# Patient Record
Sex: FEMALE | Race: BLACK OR AFRICAN AMERICAN | URBAN - METROPOLITAN AREA
[De-identification: names, ages, dates, MRNs, and addresses within clinical notes are randomized per-mention and may not be internally consistent; named-entity substitution may affect disease eponyms.]

---

## 2021-10-08 ENCOUNTER — TESTING ONLY (OUTPATIENT)
Dept: URBAN - METROPOLITAN AREA CLINIC 1 | Facility: CLINIC | Age: 86
End: 2021-10-08
Payer: MEDICARE

## 2021-10-08 DIAGNOSIS — H40.1132 PRIMARY OPEN-ANGLE GLAUCOMA, BILATERAL, MODERATE STAGE: Primary | ICD-10-CM

## 2021-10-08 PROCEDURE — 92083 EXTENDED VISUAL FIELD XM: CPT | Performed by: SPECIALIST

## 2021-11-09 ENCOUNTER — OFFICE VISIT (OUTPATIENT)
Dept: URBAN - METROPOLITAN AREA CLINIC 1 | Facility: CLINIC | Age: 86
End: 2021-11-09
Payer: MEDICARE

## 2021-11-09 PROCEDURE — 92286 ANT SGM IMG I&R SPECLR MIC: CPT | Performed by: SPECIALIST

## 2021-11-09 PROCEDURE — 99212 OFFICE O/P EST SF 10 MIN: CPT | Performed by: SPECIALIST

## 2021-11-09 ASSESSMENT — INTRAOCULAR PRESSURE
OS: 17
OD: 21

## 2021-11-09 NOTE — IMPRESSION/PLAN
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 Plan: OU: Discussed diagnosis in detail with patient. Advised patient of condition. Reassured patient of current condition and treatment. No need for further treatment or surgical intervention.

## 2021-11-09 NOTE — IMPRESSION/PLAN
Impression: Primary open-angle glaucoma, bilateral, moderate stage: K27.9213. Plan: Intraocular pressure well controlled, tolerating medications. Will continue with same regimen. Reviewed HVF done 10/08/21. Pt to continue with Azopt bid ou and Lumigan qhs ou. 

RTC 4 month f/u RNFL [facility]

## 2022-03-16 ENCOUNTER — OFFICE VISIT (OUTPATIENT)
Dept: URBAN - METROPOLITAN AREA CLINIC 1 | Facility: CLINIC | Age: 87
End: 2022-03-16
Payer: MEDICARE

## 2022-03-16 DIAGNOSIS — H18.519: ICD-10-CM

## 2022-03-16 DIAGNOSIS — Z96.1 PRESENCE OF INTRAOCULAR LENS: ICD-10-CM

## 2022-03-16 DIAGNOSIS — H35.3131 NONEXUDATIVE MACULAR DEGENERATION, EARLY DRY STAGE, BILATERAL: ICD-10-CM

## 2022-03-16 DIAGNOSIS — H47.233 GLAUCOMATOUS OPTIC ATROPHY, BILATERAL: ICD-10-CM

## 2022-03-16 DIAGNOSIS — H04.123 TEAR FILM INSUFFICIENCY OF BILATERAL LACRIMAL GLANDS: ICD-10-CM

## 2022-03-16 PROCEDURE — 92133 CPTRZD OPH DX IMG PST SGM ON: CPT | Performed by: SPECIALIST

## 2022-03-16 PROCEDURE — 99213 OFFICE O/P EST LOW 20 MIN: CPT | Performed by: SPECIALIST

## 2022-03-16 ASSESSMENT — INTRAOCULAR PRESSURE
OD: 15
OS: 15

## 2022-03-16 NOTE — IMPRESSION/PLAN
Impression: Primary open-angle glaucoma, bilateral, moderate stage: T06.1702. Plan: Intraocular pressure well controlled, tolerating medications. Will continue with same regimen.  Lumigan QHS OU and Azopt BID Charu Clement

## 2022-10-20 ENCOUNTER — OFFICE VISIT (OUTPATIENT)
Dept: URBAN - METROPOLITAN AREA CLINIC 1 | Facility: CLINIC | Age: 87
End: 2022-10-20
Payer: MEDICARE

## 2022-10-20 DIAGNOSIS — H35.3131 NONEXUDATIVE MACULAR DEGENERATION, EARLY DRY STAGE, BILATERAL: ICD-10-CM

## 2022-10-20 DIAGNOSIS — H47.021 HEMORRHAGE IN OPTIC NERVE SHEATH, RIGHT EYE: ICD-10-CM

## 2022-10-20 DIAGNOSIS — H47.233 GLAUCOMATOUS OPTIC ATROPHY, BILATERAL: ICD-10-CM

## 2022-10-20 DIAGNOSIS — H40.1132 PRIMARY OPEN-ANGLE GLAUCOMA, BILATERAL, MODERATE STAGE: Primary | ICD-10-CM

## 2022-10-20 PROCEDURE — 92250 FUNDUS PHOTOGRAPHY W/I&R: CPT | Performed by: SPECIALIST

## 2022-10-20 PROCEDURE — 99214 OFFICE O/P EST MOD 30 MIN: CPT | Performed by: SPECIALIST

## 2022-10-20 RX ORDER — DORZOLAMIDE HCL 20 MG/ML
2 % SOLUTION/ DROPS OPHTHALMIC
Qty: 10 | Refills: 3 | Status: ACTIVE
Start: 2022-10-20

## 2022-10-20 RX ORDER — BIMATOPROST 0.1 MG/ML
0.01 % SOLUTION/ DROPS OPHTHALMIC
Qty: 2.5 | Refills: 3 | Status: ACTIVE
Start: 2022-10-20

## 2022-10-20 RX ORDER — BIMATOPROST 0.1 MG/ML
0.01 % SOLUTION/ DROPS OPHTHALMIC
Qty: 2.5 | Refills: 3 | Status: INACTIVE
Start: 2022-10-20 | End: 2022-10-20

## 2022-10-20 ASSESSMENT — VISUAL ACUITY
OD: 20/30
OS: 20/40

## 2022-10-20 ASSESSMENT — INTRAOCULAR PRESSURE
OD: 21
OS: 18

## 2022-10-20 NOTE — IMPRESSION/PLAN
Impression: Primary open-angle glaucoma, bilateral, moderate stage: A93.3413. Plan: Discussed diagnosis with patient; reviewed testing. Stable. IOP is at target range. Will continue monitor patient on current regimen. Continue with Azopt BID OU and Lumigan QHS OU. During her appeal with insurance for Azopt patient will use Dorzolamide BID OU. Patient will be referred to Dr. Cruz Tellez for surgical intervention due to IOP too high and it is difficult for patient to remember to use her drops.

## 2022-10-20 NOTE — IMPRESSION/PLAN
Impression: Nonexudative macular degeneration, early dry stage, bilateral: H35.3131. Plan: Macular degeneration, dry type with stable vision. Will continue to monitor vision and the patient has been instructed to call with any vision changes. Use of Amsler grid was explained. Vitamins recommended. Advised to eat healthy green leafy vegetables.

## 2022-10-20 NOTE — IMPRESSION/PLAN
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 Plan: Discussed diagnosis in detail with patient. Advised patient of condition. Will continue to observe condition and or symptoms.

## 2023-09-21 NOTE — IMPRESSION/PLAN
Impression: Glaucomatous optic atrophy, bilateral: H47.233. Plan: Patient with stable ONC OU and no evidence of glaucomatous progression. No treatment at this time. We will continue to observe closely. Treating blepharitis/Dry eye syndrome   Ophthalmologists at the [facility] recommend controlling the problem through eyelid hygiene.   Eyelid hygiene is cleansing of the lid to promote a normal ocular surface. It is important to cleanse so that it does not develop into a more serious condition. Blepharitis cannot be cured, but with eyelid hygiene done on a daily basis, the symptoms may be controlled.   Cleansing the eye  Hot Compresses: perform 2 times daily, or as ordered by your doctor      A.  Soak clean washcloth in hot water  OR      B.  Commercially available hot pack  OR      C.  Dry rice in a clean sock (dress sock or other thin sock is best), microwave until hot (20-30 seconds)   Cleansing the eye  Hot Compresses: perform 2 times daily, or as ordered by your doctor  Soak clean washcloth in hot water  OR  Commercially available hot pack  OR  Dry rice in a clean sock (dress sock or other thin sock is best), microwave until hot (20-30 seconds)     - Place hot compress on closed eyelid for 3-5 minutes (make sure not too hot)   - Gently massage eyelids for 30 seconds    Eyelid Scrubs:   Make solution with   water +   baby shampoo OR  In shower: place baby shampoo on fingertips OR  Steri-Lid cleansing solution    -  Use solution of choice on fingertips to  brush  your eyelids (like brushing teeth) for 30 seconds  -  Rinse eyelids and eyelashes with clean water  - Wipe dry with clean, dry cloth (joleen cloth is best)      Additional care for blepharitis  Keep your hands and face clean. Be careful not to touch or rub your eyes with soiled handkerchiefs, dirty fingers, etc. Women should avoid the use of eye makeup during the early stages of treatment. When cosmetic use is resumed, replace liquid products because your old products may be contaminated. Remove all eye makeup before bedtime.   Occasionally, medication may be prescribed to treat blepharitis, but only your doctor can decide if you are a candidate  for this treatment.

## 2024-03-28 ENCOUNTER — OFFICE VISIT (OUTPATIENT)
Dept: URBAN - METROPOLITAN AREA CLINIC 1 | Facility: CLINIC | Age: 89
End: 2024-03-28
Payer: MEDICARE

## 2024-03-28 DIAGNOSIS — H47.293 OTHER OPTIC ATROPHY, BILATERAL: ICD-10-CM

## 2024-03-28 DIAGNOSIS — H35.3131 NONEXUDATIVE MACULAR DEGENERATION, EARLY DRY STAGE, BILATERAL: ICD-10-CM

## 2024-03-28 DIAGNOSIS — H04.123 TEAR FILM INSUFFICIENCY OF BILATERAL LACRIMAL GLANDS: Primary | ICD-10-CM

## 2024-03-28 DIAGNOSIS — Z96.1 PRESENCE OF INTRAOCULAR LENS: ICD-10-CM

## 2024-03-28 DIAGNOSIS — H40.1132 PRIMARY OPEN-ANGLE GLAUCOMA, BILATERAL, MODERATE STAGE: ICD-10-CM

## 2024-03-28 DIAGNOSIS — H52.223 REGULAR ASTIGMATISM, BILATERAL: ICD-10-CM

## 2024-03-28 PROCEDURE — 99203 OFFICE O/P NEW LOW 30 MIN: CPT

## 2024-03-28 RX ORDER — LEVOTHYROXINE SODIUM 50 UG/1
CAPSULE ORAL
Qty: 0 | Refills: 0 | Status: ACTIVE
Start: 2024-03-28

## 2024-03-28 RX ORDER — ROSUVASTATIN CALCIUM 10 MG/1
10 MG TABLET, FILM COATED ORAL
Qty: 0 | Refills: 0 | Status: ACTIVE
Start: 2024-03-28

## 2024-03-28 ASSESSMENT — INTRAOCULAR PRESSURE
OD: 19
OS: 23

## 2025-08-22 ENCOUNTER — OFFICE VISIT (OUTPATIENT)
Dept: URBAN - METROPOLITAN AREA CLINIC 5 | Facility: CLINIC | Age: OVER 89
End: 2025-08-22
Payer: COMMERCIAL

## 2025-08-22 DIAGNOSIS — H35.3131 NONEXUDATIVE MACULAR DEGENERATION, EARLY DRY STAGE, BILATERAL: ICD-10-CM

## 2025-08-22 DIAGNOSIS — H40.1112 PRIMARY OPEN-ANGLE GLAUCOMA, MODERATE STAGE, RIGHT EYE: ICD-10-CM

## 2025-08-22 DIAGNOSIS — H40.1123 PRIMARY OPEN-ANGLE GLAUCOMA, SEVERE STAGE, LEFT EYE: Primary | ICD-10-CM

## 2025-08-22 DIAGNOSIS — H04.123 TEAR FILM INSUFFICIENCY OF BILATERAL LACRIMAL GLANDS: ICD-10-CM

## 2025-08-22 DIAGNOSIS — Z96.1 PRESENCE OF INTRAOCULAR LENS: ICD-10-CM

## 2025-08-22 PROCEDURE — 92014 COMPRE OPH EXAM EST PT 1/>: CPT | Performed by: OPTOMETRIST

## 2025-08-22 RX ORDER — DORZOLAMIDE HCL 20 MG/ML
2 % SOLUTION/ DROPS OPHTHALMIC
Qty: 10 | Refills: 3 | Status: ACTIVE
Start: 2025-08-22

## 2025-08-22 RX ORDER — RIBOFLAVIN (VITAMIN B2) 100 MG
100 MG TABLET ORAL
Qty: 0 | Refills: 0 | Status: ACTIVE
Start: 2025-08-22

## 2025-08-22 RX ORDER — ROSUVASTATIN CALCIUM 10 MG/1
10 MG TABLET, COATED ORAL
Qty: 0 | Refills: 0 | Status: ACTIVE
Start: 2025-08-22

## 2025-08-22 RX ORDER — LEVOTHYROXINE SODIUM 50 UG/1
CAPSULE ORAL
Qty: 0 | Refills: 0 | Status: ACTIVE
Start: 2025-08-22

## 2025-08-22 RX ORDER — BIMATOPROST 0.1 MG/ML
0.01 % SOLUTION/ DROPS OPHTHALMIC
Qty: 2.5 | Refills: 3 | Status: ACTIVE
Start: 2025-08-22

## 2025-08-22 RX ORDER — MULTIVIT,IRON,MINERALS/LUTEIN
TABLET ORAL
Qty: 0 | Refills: 0 | Status: ACTIVE
Start: 2025-08-22

## 2025-08-22 RX ORDER — POTASSIUM CITRATE 10 MEQ/1
TABLET, EXTENDED RELEASE ORAL
Qty: 0 | Refills: 0 | Status: ACTIVE
Start: 2025-08-22

## 2025-08-22 RX ORDER — ASPIRIN 81 MG/1
81 MG TABLET ORAL
Qty: 0 | Refills: 0 | Status: ACTIVE
Start: 2025-08-22

## 2025-08-22 RX ORDER — RESVER/WINE/BFL/GRPSD/PC/C/POM 200MG-60MG
CAPSULE ORAL
Qty: 0 | Refills: 0 | Status: ACTIVE
Start: 2025-08-22

## 2025-08-22 RX ORDER — AMLODIPINE BESYLATE 2.5 MG/1
2.5 MG TABLET ORAL
Qty: 0 | Refills: 0 | Status: ACTIVE
Start: 2025-08-22

## 2025-08-22 ASSESSMENT — INTRAOCULAR PRESSURE
OS: 15
OD: 12